# Patient Record
Sex: MALE | Race: OTHER | HISPANIC OR LATINO
[De-identification: names, ages, dates, MRNs, and addresses within clinical notes are randomized per-mention and may not be internally consistent; named-entity substitution may affect disease eponyms.]

---

## 2021-03-31 PROBLEM — Z00.00 ENCOUNTER FOR PREVENTIVE HEALTH EXAMINATION: Status: ACTIVE | Noted: 2021-03-31

## 2021-04-02 ENCOUNTER — APPOINTMENT (OUTPATIENT)
Dept: UROLOGY | Facility: CLINIC | Age: 71
End: 2021-04-02
Payer: MEDICARE

## 2021-04-02 VITALS
HEIGHT: 68 IN | BODY MASS INDEX: 25.76 KG/M2 | WEIGHT: 170 LBS | SYSTOLIC BLOOD PRESSURE: 156 MMHG | DIASTOLIC BLOOD PRESSURE: 77 MMHG | TEMPERATURE: 98.1 F

## 2021-04-02 DIAGNOSIS — Z78.9 OTHER SPECIFIED HEALTH STATUS: ICD-10-CM

## 2021-04-02 DIAGNOSIS — Z87.19 PERSONAL HISTORY OF OTHER DISEASES OF THE DIGESTIVE SYSTEM: ICD-10-CM

## 2021-04-02 DIAGNOSIS — Z80.1 FAMILY HISTORY OF MALIGNANT NEOPLASM OF TRACHEA, BRONCHUS AND LUNG: ICD-10-CM

## 2021-04-02 PROCEDURE — 99072 ADDL SUPL MATRL&STAF TM PHE: CPT

## 2021-04-02 PROCEDURE — 99204 OFFICE O/P NEW MOD 45 MIN: CPT

## 2021-04-02 NOTE — HISTORY OF PRESENT ILLNESS
[Nocturia] : nocturia [Erectile Dysfunction] : Erectile Dysfunction [FreeTextEntry1] : 70 year retired \par  x 20 years\par accompanied by wife\par found to have elevated PSA 8\par nocturia z 1\par no frequency\par excellent stream \par no hematuria [Urinary Incontinence] : no urinary incontinence [Urinary Retention] : no urinary retention [Urinary Urgency] : no urinary urgency [Urinary Frequency] : no urinary frequency [Straining] : no straining [Weak Stream] : no weak stream

## 2021-04-02 NOTE — ASSESSMENT
[FreeTextEntry1] : Ms. Hinojosa is a 70-year-old retired .  He presents with a elevated PSA.  He denies any urinary symptoms.  He does endorse erectile dysfunction which followed his cardiac catheterization and stent placement.  He and his wife are not interested in pursuing this at this time.\par \par He is referred for an elevated PSA of 8.0.  Not aware of having had a prior PSA.  His prostate exam is unremarkable.  He has no family history of prostate cancer.\par \par Clearly PSA of this elevation is of concern.  We will repeat the PSA at this time and proceed with a prostate MRI.  I discussed proceeding with a biopsy of the prostate.  His wife is at this point unwilling to proceed with this.  We discussed meeting again after the MRI so these findings can be reviewed in conjunction with PSA values.\par We discussed significance of these findings.\par \par \par The FELICITA HINOJOSA  expressed fully understanding of the information provided, the consequences and the management.\par

## 2021-04-02 NOTE — PHYSICAL EXAM
[Exaggerated Use Of Accessory Muscles For Inspiration] : no accessory muscle use [Auscultation Breath Sounds / Voice Sounds] : lungs were clear to auscultation bilaterally [Abdomen Soft] : soft [Abdomen Tenderness] : non-tender [Abdomen Mass (___ Cm)] : no abdominal mass palpated [Abdomen Hernia] : no hernia was discovered [Costovertebral Angle Tenderness] : no ~M costovertebral angle tenderness [Penis Abnormality] : normal uncircumcised penis [Epididymis] : the epididymides were normal [Testes Tenderness] : no tenderness of the testes [Testes Mass (___cm)] : there were no testicular masses [Prostate Enlargement] : the prostate was not enlarged [Prostate Tenderness] : the prostate was not tender [No Prostate Nodules] : no prostate nodules

## 2021-04-03 LAB
APPEARANCE: ABNORMAL
BACTERIA: NEGATIVE
BILIRUBIN URINE: NEGATIVE
BLOOD URINE: NEGATIVE
COLOR: YELLOW
GLUCOSE QUALITATIVE U: NEGATIVE
HYALINE CASTS: 1 /LPF
KETONES URINE: NEGATIVE
LEUKOCYTE ESTERASE URINE: NEGATIVE
MICROSCOPIC-UA: NORMAL
NITRITE URINE: NEGATIVE
PH URINE: 5.5
PROTEIN URINE: NORMAL
PSA SERPL-MCNC: 9.15 NG/ML
RED BLOOD CELLS URINE: 1 /HPF
SPECIFIC GRAVITY URINE: 1.02
SQUAMOUS EPITHELIAL CELLS: 0 /HPF
UROBILINOGEN URINE: NORMAL
WHITE BLOOD CELLS URINE: 3 /HPF

## 2021-04-05 ENCOUNTER — NON-APPOINTMENT (OUTPATIENT)
Age: 71
End: 2021-04-05

## 2021-04-05 LAB — BACTERIA UR CULT: NORMAL

## 2021-04-09 ENCOUNTER — NON-APPOINTMENT (OUTPATIENT)
Age: 71
End: 2021-04-09

## 2021-04-12 ENCOUNTER — NON-APPOINTMENT (OUTPATIENT)
Age: 71
End: 2021-04-12

## 2021-04-12 ENCOUNTER — APPOINTMENT (OUTPATIENT)
Dept: MRI IMAGING | Facility: HOSPITAL | Age: 71
End: 2021-04-12

## 2021-09-07 ENCOUNTER — APPOINTMENT (OUTPATIENT)
Dept: UROLOGY | Facility: CLINIC | Age: 71
End: 2021-09-07
Payer: MEDICARE

## 2021-09-07 VITALS
DIASTOLIC BLOOD PRESSURE: 94 MMHG | OXYGEN SATURATION: 97 % | HEART RATE: 77 BPM | BODY MASS INDEX: 25.76 KG/M2 | WEIGHT: 170 LBS | SYSTOLIC BLOOD PRESSURE: 163 MMHG | TEMPERATURE: 97.8 F | HEIGHT: 68 IN

## 2021-09-07 PROCEDURE — 99213 OFFICE O/P EST LOW 20 MIN: CPT

## 2021-09-07 NOTE — HISTORY OF PRESENT ILLNESS
[FreeTextEntry1] : 70 year retired \par  x 20 years\par accompanied by wife\par found to have elevated PSA 8\par nocturia z 1\par no frequency\par excellent stream \par no hematuria\par \par 9.7.2021\par returns re PSA\par occassional urgency\par no nocturis\par no dysfuria

## 2021-09-07 NOTE — ASSESSMENT
[FreeTextEntry1] : Ms. Hinojosa is a 70-year-old retired .  He presents with a elevated PSA.  He denies any urinary symptoms.  He does endorse erectile dysfunction which followed his cardiac catheterization and stent placement.  He and his wife are not interested in pursuing this at this time.\par \par He is referred for an elevated PSA of 8.0.  Not aware of having had a prior PSA.  His prostate exam is unremarkable.  He has no family history of prostate cancer.\par \par Clearly PSA of this elevation is of concern.  We will repeat the PSA at this time and proceed with a prostate MRI.  I discussed proceeding with a biopsy of the prostate.  His wife is at this point unwilling to proceed with this.  We discussed meeting again after the MRI so these findings can be reviewed in conjunction with PSA values.\par We discussed significance of these findings.\par \par \par The FELICITA HINOJOSA  expressed fully understanding of the information provided, the consequences and the management.\par \par 9.7.0221\par previously unable to get MRI \par not approved \par recent PSA 10.5\par \par We discussed the risks and complications of prostate biopsy and discussed the procedure.\par We discussed that procedure is performed by placing a rectal probe and administering anesthesia locally.\par Biopsies are then taken with a needle. Multiple biopsies are taken, \par Risks of the procedure include hematuria, hematospermia, blood per rectum.\par Risks include infection, sepsis and even death.\par We discussed perineal vs transrectal biopsy\par We discussed decreased risk of perineal biopsy vs transrectal but associated greater discomfort \par We discussed options of: 1. local, 2. IV sedation in Operating room 3. po Valium\par Fleets enema should be taken.\par ASA and NSAID must be stopped 1 week prior to procedure.\par

## 2021-09-09 LAB — BACTERIA UR CULT: NORMAL

## 2021-09-20 ENCOUNTER — NON-APPOINTMENT (OUTPATIENT)
Age: 71
End: 2021-09-20

## 2021-09-21 ENCOUNTER — NON-APPOINTMENT (OUTPATIENT)
Age: 71
End: 2021-09-21

## 2021-11-08 ENCOUNTER — RESULT REVIEW (OUTPATIENT)
Age: 71
End: 2021-11-08

## 2021-11-08 ENCOUNTER — TRANSCRIPTION ENCOUNTER (OUTPATIENT)
Age: 71
End: 2021-11-08

## 2021-11-09 ENCOUNTER — APPOINTMENT (OUTPATIENT)
Dept: UROLOGY | Facility: AMBULATORY SURGERY CENTER | Age: 71
End: 2021-11-09

## 2021-11-09 ENCOUNTER — OUTPATIENT (OUTPATIENT)
Dept: OUTPATIENT SERVICES | Facility: HOSPITAL | Age: 71
LOS: 1 days | Discharge: HOME | End: 2021-11-09
Payer: MEDICARE

## 2021-11-09 ENCOUNTER — TRANSCRIPTION ENCOUNTER (OUTPATIENT)
Age: 71
End: 2021-11-09

## 2021-11-09 LAB
GLUCOSE BLDC GLUCOMTR-MCNC: 95 MG/DL — SIGNIFICANT CHANGE UP (ref 70–99)
SARS-COV-2 N GENE NPH QL NAA+PROBE: NOT DETECTED

## 2021-11-09 PROCEDURE — 55706 BX PRST8 NDL SAT SAMPLING: CPT

## 2021-11-09 PROCEDURE — 88305 TISSUE EXAM BY PATHOLOGIST: CPT | Mod: 26

## 2021-11-09 PROCEDURE — 76872 US TRANSRECTAL: CPT | Mod: 26

## 2021-11-09 NOTE — BRIEF OPERATIVE NOTE - NSICDXBRIEFPREOP_GEN_ALL_CORE_FT
PRE-OP DIAGNOSIS:  Elevated PSA 09-Nov-2021 19:01:12  Lenny Frost  BPH with elevated PSA 09-Nov-2021 19:01:18  Lenny Frost

## 2021-11-09 NOTE — BRIEF OPERATIVE NOTE - NSICDXBRIEFPROCEDURE_GEN_ALL_CORE_FT
PROCEDURES:  Biopsy of prostate by transperineal approach with integrated magnetic resonance-ultrasound guidance 09-Nov-2021 19:01:01  Lenny Frost

## 2021-11-10 ENCOUNTER — NON-APPOINTMENT (OUTPATIENT)
Age: 71
End: 2021-11-10

## 2021-11-10 ENCOUNTER — APPOINTMENT (OUTPATIENT)
Dept: UROLOGY | Facility: CLINIC | Age: 71
End: 2021-11-10
Payer: MEDICARE

## 2021-11-10 VITALS
OXYGEN SATURATION: 98 % | SYSTOLIC BLOOD PRESSURE: 171 MMHG | DIASTOLIC BLOOD PRESSURE: 93 MMHG | HEART RATE: 81 BPM | TEMPERATURE: 98.3 F

## 2021-11-10 DIAGNOSIS — R97.20 ELEVATED PROSTATE, SPECIFIC ANTIGEN [PSA]: ICD-10-CM

## 2021-11-10 PROCEDURE — 51702 INSERT TEMP BLADDER CATH: CPT

## 2021-11-10 PROCEDURE — 99213 OFFICE O/P EST LOW 20 MIN: CPT | Mod: 25

## 2021-11-10 NOTE — ASSESSMENT
[FreeTextEntry1] : Ms. Hinojosa is a 70-year-old retired .  He presents with a elevated PSA.  He denies any urinary symptoms.  He does endorse erectile dysfunction which followed his cardiac catheterization and stent placement.  He and his wife are not interested in pursuing this at this time.\par \par He is referred for an elevated PSA of 8.0.  Not aware of having had a prior PSA.  His prostate exam is unremarkable.  He has no family history of prostate cancer.\par \par Clearly PSA of this elevation is of concern.  We will repeat the PSA at this time and proceed with a prostate MRI.  I discussed proceeding with a biopsy of the prostate.  His wife is at this point unwilling to proceed with this.  We discussed meeting again after the MRI so these findings can be reviewed in conjunction with PSA values.\par We discussed significance of these findings.\par \par \par The FELICITA HINOJOSA  expressed fully understanding of the information provided, the consequences and the management.\par \par \par 11.10.2021\par Patient returns this afternoon with inability to urinate.  He states he has been urinating well until this morning.  He has no fevers chills etc.  His bladder was distended on examination.  A 18 coudé was inserted with clear return of urine.\par \par He will be started on Flomax 0.4 mg daily.  He was warned re: dizziness.  He has hypertension.\par He will be started on Cipro 500 mg.\par \par We plan to proceed with a voiding trial on Monday morning.  He will start antibiotics prior to this 24 hours.\par Plan was discussed with the patient and his daughter.\par The FELICITA HINOJOSA  expressed fully understanding of the information provided, the consequences and the management.\par \par

## 2021-11-10 NOTE — HISTORY OF PRESENT ILLNESS
[FreeTextEntry1] : 70 year retired \par  x 20 years\par accompanied by wife\par found to have elevated PSA 8\par nocturia z 1\par no frequency\par excellent stream \par no hematuria\par \par 9.7.2021\par returns re PSA\par occassional urgency\par no nocturis\par no dysfuria\par \par \par 11.9.2021 - fusion biopsy prostate\par \par 11.10.2021 - urinary retention

## 2021-11-11 ENCOUNTER — RX RENEWAL (OUTPATIENT)
Age: 71
End: 2021-11-11

## 2021-11-12 LAB — SURGICAL PATHOLOGY STUDY: SIGNIFICANT CHANGE UP

## 2021-11-14 ENCOUNTER — LABORATORY RESULT (OUTPATIENT)
Age: 71
End: 2021-11-14

## 2021-11-14 ENCOUNTER — TRANSCRIPTION ENCOUNTER (OUTPATIENT)
Age: 71
End: 2021-11-14

## 2021-11-15 ENCOUNTER — APPOINTMENT (OUTPATIENT)
Dept: UROLOGY | Facility: CLINIC | Age: 71
End: 2021-11-15
Payer: MEDICARE

## 2021-11-15 VITALS — TEMPERATURE: 97.2 F | DIASTOLIC BLOOD PRESSURE: 79 MMHG | SYSTOLIC BLOOD PRESSURE: 161 MMHG | HEART RATE: 87 BPM

## 2021-11-15 PROCEDURE — A4218: CPT | Mod: NC

## 2021-11-15 PROCEDURE — 51700 IRRIGATION OF BLADDER: CPT | Mod: 79

## 2021-11-15 PROCEDURE — 99214 OFFICE O/P EST MOD 30 MIN: CPT | Mod: 24

## 2021-11-18 ENCOUNTER — APPOINTMENT (OUTPATIENT)
Dept: UROLOGY | Facility: CLINIC | Age: 71
End: 2021-11-18
Payer: MEDICARE

## 2021-11-18 VITALS — SYSTOLIC BLOOD PRESSURE: 153 MMHG | HEART RATE: 78 BPM | DIASTOLIC BLOOD PRESSURE: 88 MMHG | TEMPERATURE: 98 F

## 2021-11-18 PROCEDURE — 99213 OFFICE O/P EST LOW 20 MIN: CPT | Mod: 25

## 2021-11-18 PROCEDURE — 51798 US URINE CAPACITY MEASURE: CPT

## 2021-11-18 PROCEDURE — 51702 INSERT TEMP BLADDER CATH: CPT

## 2021-11-18 NOTE — HISTORY OF PRESENT ILLNESS
[FreeTextEntry1] : 70 year retired \par  x 20 years\par accompanied by wife\par found to have elevated PSA 8\par nocturia x 1\par no frequency\par excellent stream \par no hematuria\par \par 9.7.2021\par returns re PSA\par occasional urgency\par no nocturia\par no dysuria\par \par \par 11.9.2021 - fusion biopsy prostate\par \par 11.10.2021 - urinary retention\par \par 11.15.2021\par returns with daughters to review biopsy and management of urinary retention

## 2021-11-18 NOTE — HISTORY OF PRESENT ILLNESS
[FreeTextEntry1] : 70 year retired \par  x 20 years\par accompanied by wife\par found to have elevated PSA 8\par nocturia x 1\par no frequency\par excellent stream \par no hematuria\par \par 9.7.2021\par returns re PSA\par occasional urgency\par no nocturia\par no dysuria\par \par \par 11.9.2021 - fusion biopsy prostate\par \par 11.10.2021 - urinary retention\par \par 11.15.2021\par returns with daughters to review biopsy and management of urinary retention\par \par 11.18.2021\par returns with daughter for TOV\par he has resumed flomax and has taken cipro for TOV

## 2021-11-18 NOTE — ASSESSMENT
[FreeTextEntry1] : Ms. Hinojosa is a 70-year-old retired .  He presents with a elevated PSA.  He denies any urinary symptoms.  He does endorse erectile dysfunction which followed his cardiac catheterization and stent placement.  He and his wife are not interested in pursuing this at this time.\par \par He is referred for an elevated PSA of 8.0.  Not aware of having had a prior PSA.  His prostate exam is unremarkable.  He has no family history of prostate cancer.\par \par Clearly PSA of this elevation is of concern.  We will repeat the PSA at this time and proceed with a prostate MRI.  I discussed proceeding with a biopsy of the prostate.  His wife is at this point unwilling to proceed with this.  We discussed meeting again after the MRI so these findings can be reviewed in conjunction with PSA values.\par We discussed significance of these findings.\par \par \par The FELICITA HINOJOSA  expressed fully understanding of the information provided, the consequences and the management.\par \par \par 11.10.2021\par Patient returns this afternoon with inability to urinate.  He states he has been urinating well until this morning.  He has no fevers chills etc.  His bladder was distended on examination.  A 18 coudé was inserted with clear return of urine.\par \par He will be started on Flomax 0.4 mg daily.  He was warned re: dizziness.  He has hypertension.\par He will be started on Cipro 500 mg.\par \par We plan to proceed with a voiding trial on Monday morning.  He will start antibiotics prior to this 24 hours.\par Plan was discussed with the patient and his daughter.\par The FELICITA HINOJOSA  expressed fully understanding of the information provided, the consequences and the management.\par \par \par 11.15.2021\par Patient returns with 2 daughters.\par Reviewed pathology\par \par PROSTATE CANCER STAGING PSA:\par Crowder's score, number of cores, extent of cores\par \par We discussed risk classification based on Crowder's score, PSA and % of cores positive\par Favorable intermediate	[id number] OR  Crowder score 3+4 = 7/grade group 2 OR  PSA 10 to 20 ng/mL  AND Percentage of positive biopsy cores <50% 	\par Unfavorable intermediate	[id number] OR  Crowder score 3+4 = 7/grade group 2 or Maxim score 4+3 = 7/grade group 3 OR  PSA 10 to 20 ng/mL 	\par \par Adapted from: NCCN Clinical Practice Guidelines in Oncology (NCCN Guidelines®): Prostate Cancer. Version 4.2018. \par \par Sandy Accession Number : [id number]\par Patient:   FELICITA HINOJOSA\par \par \par Accession:                             [id number]\par \par Collected Date/Time:                   11/9/2021 00:32 EST\par Received Date/Time:                    11/10/2021 00:32 EST\par \par Surgical Pathology Report - Auth (Verified)\par \par Specimen(s) Submitted\par 1  Left anterior apex\par 2  Left anterior base\par 3  Right anterior apex\par 4  Right anterior base\par 5  Midline apex\par 6  Midline base\par 7  Left posterior apex\par 8  Left posterior base\par 9  Right posterior apex\par 10  Right posterior base\par 11  Left lateral\par 12  Right lateral\par 13  MRI target #1 left peripheral\par \par \par Final Diagnosis\par 1. Prostate, left anterior apex, biopsy\par -Benign prostate tissue.\par \par 2. Prostate, left anterior base, biopsy\par -Benign prostate tissue.\par \par 3. Prostate, right anterior apex, biopsy\par -Benign prostate tissue.\par \par 4. Prostate, right anterior base, biopsy\par -Benign prostate tissue.\par \par 5. Prostate, midline apex, biopsy\par -Benign prostate tissue.\par \par 6. Prostate, midline base, biopsy\par -Benign prostate tissue.\par \par 7. Prostate, left posterior apex, biopsy\par -Adenocarcinoma of the prostate, Prognostic Grade Group 2 (Crowder\par score 3+4=7) involving 40% and 15% (2 mm and 1 mm in length) of 2 of 2\par core(s). Maxim pattern 4 comprises 5% of tumor.\par \par 8. Prostate, left posterior base, biopsy\par -Benign prostate tissue.\par \par 9. Prostate, right posterior apex, biopsy\par -Benign prostate tissue.\par \par 10. Prostate, right posterior base, biopsy\par -Adenocarcinoma of the prostate, Prognostic Grade Group 1 (Crowder\par score 3+3=6) involving less than 5% (less than 0.5 mm in length) of 1 of\par 1 core(s).\par \par 11. Prostate, left lateral, biopsy\par -Benign prostate tissue.\par \par 12. Prostate, right lateral, biopsy\par -Benign prostate tissue.\par \par 13. Prostate, MRI target 1 left peripheral, biopsy\par -Adenocarcinoma of the prostate, Prognostic Grade Group 2 (Crowder\par score 3+4=7) involving 100%, 90%, 90%, 90% and 60% (11 mm, 9 mm, 9 mm, 9\par mm and 6 mm in length) of 5 of 5 core(s). Maxim pattern 4 comprises 15%\par of tumor.\par -Perineural invasion is identified.\par \par \par \par We discussed treatment options:\par \par Crowder's score, number of cores, extent of cores\par \par We discussed risk classification based on Maxim's score, PSA and % of cores positive\par Favorable intermediate	[id number] OR  Crowder score 3+4 = 7/grade group 2 OR  PSA 10 to 20 ng/mL  AND Percentage of positive biopsy cores <50% 	\par Unfavorable intermediate	[id number] OR  Crowder score 3+4 = 7/grade group 2 or Maxim score 4+3 = 7/grade group 3 OR  PSA 10 to 20 ng/mL 	\par \par 1. RT and radical prostatectomy are both appropriate \par options for men with intermediate-risk disease.\par \par 2.  Active surveillance is an option for for those with favorable intermediate-risk disease. Patient informed that this comes with a higher risk of developing metastases compared \par with definitive treatment. \par \par The choice of a specific approach requires a \par consideration of the benefits and risks associated with each approach, taking \par into account the patient's age, individual preferences and comorbidities, and \par estimated life expectancy.\par \par We had an extensive conversation regarding \par Risks and complications of each were discussed with special reference to sexual and urinary function\par We discussed recommendation for radation therapy in light of pathology and PSA and comorbidities.\par We discussed ADT and side effects and data re ERT + ADT\par \par Recommend \par We discussed referral to Dr Kuhn to discuss RALP\par We discussed referral to Dr Lauro Hallman  to discuss ERT\par We discussed proceeding with Decipher score and potential significance\par The FELICITA HINOJOSA  and his daughters expressed fully understanding of the information provided, the consequences and the management. \par \par Plan:\par 1. decipher test\par 2. referral Dr Hallman\par 3. referral to Dr UMBERTO Kuhn\par \par \par 11.18.2021\par \par \par Patient returns having resumed Flomax for trial of void.  He was observed during the day.  He is progressing increasing postvoid residuals with voiding of small amounts.  After discussion with the patient and his daughter he chose to be recatheterized rather than risk of going into urinary retention.  He will increase his Flomax to 2 times daily and follow-up with Dr. Strickland next week regarding management of his prostate cancer in light of his urinary retention

## 2021-11-18 NOTE — ASSESSMENT
[FreeTextEntry1] : Ms. Hinojosa is a 70-year-old retired .  He presents with a elevated PSA.  He denies any urinary symptoms.  He does endorse erectile dysfunction which followed his cardiac catheterization and stent placement.  He and his wife are not interested in pursuing this at this time.\par \par He is referred for an elevated PSA of 8.0.  Not aware of having had a prior PSA.  His prostate exam is unremarkable.  He has no family history of prostate cancer.\par \par Clearly PSA of this elevation is of concern.  We will repeat the PSA at this time and proceed with a prostate MRI.  I discussed proceeding with a biopsy of the prostate.  His wife is at this point unwilling to proceed with this.  We discussed meeting again after the MRI so these findings can be reviewed in conjunction with PSA values.\par We discussed significance of these findings.\par \par \par The FELICITA HINOJOSA  expressed fully understanding of the information provided, the consequences and the management.\par \par \par 11.10.2021\par Patient returns this afternoon with inability to urinate.  He states he has been urinating well until this morning.  He has no fevers chills etc.  His bladder was distended on examination.  A 18 coudé was inserted with clear return of urine.\par \par He will be started on Flomax 0.4 mg daily.  He was warned re: dizziness.  He has hypertension.\par He will be started on Cipro 500 mg.\par \par We plan to proceed with a voiding trial on Monday morning.  He will start antibiotics prior to this 24 hours.\par Plan was discussed with the patient and his daughter.\par The FELICITA HINOJOSA  expressed fully understanding of the information provided, the consequences and the management.\par \par \par 11.15.2021\par Patient returns with 2 daughters.\par Reviewed pathology\par \par PROSTATE CANCER STAGING PSA:\par [place]'s score, number of cores, extent of cores\par \par We discussed risk classification based on Maxim's score, PSA and % of cores positive\par Favorable intermediate	[id number]-C6r OR  [place] score 3+4 = 7/grade group 2 OR  PSA 10 to 20 ng/mL  AND Percentage of positive biopsy cores <50% 	\par Unfavorable intermediate	[id number]-[id number] OR  [place] score 3+4 = 7/grade group 2 or Maxim score 4+3 = 7/grade group 3 OR  PSA 10 to 20 ng/mL 	\par \par Adapted from: NCCN Clinical Practice Guidelines in Oncology (NCCN Guidelines®): Prostate Cancer. Version 4.2018. \par \par Sandy Accession Number : [id number]\par Patient:   FELICITA HINOJOSA\par \par \par Accession:                             [id number]\par \par Collected Date/Time:                   11/9/2021 00:32 EST\par Received Date/Time:                    11/10/2021 00:32 EST\par \par Surgical Pathology Report - Auth (Verified)\par \par Specimen(s) Submitted\par 1  Left anterior apex\par 2  Left anterior base\par 3  Right anterior apex\par 4  Right anterior base\par 5  Midline apex\par 6  Midline base\par 7  Left posterior apex\par 8  Left posterior base\par 9  Right posterior apex\par 10  Right posterior base\par 11  Left lateral\par 12  Right lateral\par 13  MRI target #1 left peripheral\par \par \par Final Diagnosis\par 1. Prostate, left anterior apex, biopsy\par -Benign prostate tissue.\par \par 2. Prostate, left anterior base, biopsy\par -Benign prostate tissue.\par \par 3. Prostate, right anterior apex, biopsy\par -Benign prostate tissue.\par \par 4. Prostate, right anterior base, biopsy\par -Benign prostate tissue.\par \par 5. Prostate, midline apex, biopsy\par -Benign prostate tissue.\par \par 6. Prostate, midline base, biopsy\par -Benign prostate tissue.\par \par 7. Prostate, left posterior apex, biopsy\par -Adenocarcinoma of the prostate, Prognostic Grade Group 2 ([place]\par score 3+4=7) involving 40% and 15% (2 mm and 1 mm in length) of 2 of 2\par core(s). Maxim pattern 4 comprises 5% of tumor.\par \par 8. Prostate, left posterior base, biopsy\par -Benign prostate tissue.\par \par 9. Prostate, right posterior apex, biopsy\par -Benign prostate tissue.\par \par 10. Prostate, right posterior base, biopsy\par -Adenocarcinoma of the prostate, Prognostic Grade Group 1 ([place]\par score 3+3=6) involving less than 5% (less than 0.5 mm in length) of 1 of\par 1 core(s).\par \par 11. Prostate, left lateral, biopsy\par -Benign prostate tissue.\par \par 12. Prostate, right lateral, biopsy\par -Benign prostate tissue.\par \par 13. Prostate, MRI target 1 left peripheral, biopsy\par -Adenocarcinoma of the prostate, Prognostic Grade Group 2 ([place]\par score 3+4=7) involving 100%, 90%, 90%, 90% and 60% (11 mm, 9 mm, 9 mm, 9\par mm and 6 mm in length) of 5 of 5 core(s). Maxim pattern 4 comprises 15%\par of tumor.\par -Perineural invasion is identified.\par \par \par \par We discussed treatment options:\par \par Maxim's score, number of cores, extent of cores\par \par We discussed risk classification based on [place]'s score, PSA and % of cores positive\par Favorable intermediate	[id number] OR  [place] score 3+4 = 7/grade group 2 OR  PSA 10 to 20 ng/mL  AND Percentage of positive biopsy cores <50% 	\par Unfavorable intermediate	[id number] OR  [place] score 3+4 = 7/grade group 2 or [place] score 4+3 = 7/grade group 3 OR  PSA 10 to 20 ng/mL 	\par \par 1. RT and radical prostatectomy are both appropriate \par options for men with intermediate-risk disease.\par \par 2.  Active surveillance is an option for for those with favorable intermediate-risk disease. Patient informed that this comes with a higher risk of developing metastases compared \par with definitive treatment. \par \par The choice of a specific approach requires a \par consideration of the benefits and risks associated with each approach, taking \par into account the patient's age, individual preferences and comorbidities, and \par estimated life expectancy.\par \par We had an extensive conversation regarding \par Risks and complications of each were discussed with special reference to sexual and urinary function\par We discussed recommendation for radation therapy in light of pathology and PSA and comorbidities.\par We discussed ADT and side effects and data re ERT + ADT\par \par Recommend \par We discussed referral to Dr Kuhn to discuss RALP\par We discussed referral to Dr Lauro Hallman  to discuss ERT\par We discussed proceeding with Decipher score and potential significance\par The FELICITA HINOJOSA  and his daughters expressed fully understanding of the information provided, the consequences and the management. \par \par Plan:\par 1. decipher test\par 2. referral Dr Hallman\par 3. referral to Dr UMBERTO Kuhn

## 2021-11-24 ENCOUNTER — APPOINTMENT (OUTPATIENT)
Dept: UROLOGY | Facility: CLINIC | Age: 71
End: 2021-11-24
Payer: MEDICARE

## 2021-11-24 VITALS — TEMPERATURE: 97.9 F | HEART RATE: 93 BPM | SYSTOLIC BLOOD PRESSURE: 164 MMHG | DIASTOLIC BLOOD PRESSURE: 83 MMHG

## 2021-11-24 DIAGNOSIS — N52.9 MALE ERECTILE DYSFUNCTION, UNSPECIFIED: ICD-10-CM

## 2021-11-24 DIAGNOSIS — R33.9 RETENTION OF URINE, UNSPECIFIED: ICD-10-CM

## 2021-11-24 DIAGNOSIS — I25.10 ATHEROSCLEROTIC HEART DISEASE OF NATIVE CORONARY ARTERY W/OUT ANGINA PECTORIS: ICD-10-CM

## 2021-11-24 DIAGNOSIS — E11.9 TYPE 2 DIABETES MELLITUS W/OUT COMPLICATIONS: ICD-10-CM

## 2021-11-24 PROCEDURE — 51798 US URINE CAPACITY MEASURE: CPT

## 2021-11-24 PROCEDURE — 99215 OFFICE O/P EST HI 40 MIN: CPT | Mod: 25

## 2021-11-24 NOTE — PHYSICAL EXAM
[General Appearance - Well Developed] : well developed [General Appearance - Well Nourished] : well nourished [Normal Appearance] : normal appearance [Well Groomed] : well groomed [General Appearance - In No Acute Distress] : no acute distress [Edema] : no peripheral edema [Respiration, Rhythm And Depth] : normal respiratory rhythm and effort [Exaggerated Use Of Accessory Muscles For Inspiration] : no accessory muscle use [Abdomen Soft] : soft [Abdomen Tenderness] : non-tender [Costovertebral Angle Tenderness] : no ~M costovertebral angle tenderness [Urethral Meatus] : meatus normal [Penis Abnormality] : normal uncircumcised penis [Urinary Bladder Findings] : the bladder was normal on palpation [Normal Station and Gait] : the gait and station were normal for the patient's age [] : no rash [No Focal Deficits] : no focal deficits [Oriented To Time, Place, And Person] : oriented to person, place, and time [Affect] : the affect was normal [Mood] : the mood was normal [Not Anxious] : not anxious

## 2021-11-26 PROBLEM — R33.9 URINARY RETENTION: Status: ACTIVE | Noted: 2021-11-10

## 2021-11-26 PROBLEM — N52.9 IMPOTENCE OF ORGANIC ORIGIN: Status: ACTIVE | Noted: 2021-04-02

## 2021-11-26 PROBLEM — E11.9 CONTROLLED TYPE 2 DIABETES MELLITUS: Status: RESOLVED | Noted: 2021-04-02 | Resolved: 2021-11-26

## 2021-11-26 NOTE — HISTORY OF PRESENT ILLNESS
[FreeTextEntry1] : FELICITA HINOJOSA  is a 71 year old man with PMHx HTN, NIDDM, CAD s/p PCI 2015 (1 stent) who presents for newly diagnosed prostate cancer. He underwent a fusion biopsy on 11/9/21 which was positive for [place] GG2 (targeted) and GG2 (LPA) and GG1 (RPB) on the standard biopsy. This was his first biopsy. The MRI that prompted the biopsy demonstrated a PIRADS 5 lesion, left mid pzpl. PSA at diagnosis was 10.5 ng/mL. He denies family hx of  malignancies. He presents to discuss surgery. No previous abdominal surgeries. He saw his cardiologist yesterday who provided a letter stating he was low risk for surgery.\par \par He has been experiencing urinary retention since his biopsy requiring a Green catheter. Had Green placed on 11/10/21 and started on Flomax. Failed TOV on 11/18/21 and doubled Flomax. He had minimal urinary issues prior to biopsy. Symptoms included urgency and post void dribbling. He would wear depends if going on a long trip. He denies erectile dysfunction.\par \par 230cc sterile water instilled\par Voided 200cc clear yellow urine\par PVR 24cc\par Strong flow\par \par /83. Deneis FRAUSTO, dizziness, vision changes. Has taken his BP medication this morning, he is unsure of the name.\par \par PSA Hx\par 10.5 8/26/21\par 9.1 4/2/21\par \par MRI Hx\par MRI at [facility] on 9/11/2021.  82cc prostate with PIRADS 5 lesion measuring 2.2cm, left mid pzpl. No LAD No EPE, No Bony Lesions.  \par \par Biopsy Hx\par 11/9/21-with Dr Hewitt\par LPA: Maxim 3+4, 2mm, 40%, 5% pattern 4\par RPB: [place] 3+3, 0.5mm, <5%\par Target (left peripheral): [place] 3+4, 11mm, 100%, 15% pattern 4, + PNI\par \par The patient denies fevers, chills, nausea and or vomiting and no unexplained weight loss.\par \par All pertinent laboratory, films and physician notes were reviewed.  Questionnaire results were discussed with patient.

## 2021-11-26 NOTE — ASSESSMENT
[FreeTextEntry1] : 71 year old man with newly diagnosed cT1c, [place] GG2, PSA 9.15 prostate cancer experiencing urinary retention s/p biopsy on 11/9/21. Minimal urinary complaints prior to biopsy, no ED, no previous abdominal surgeries. \par \par Discussed biopsy findings with patient and treatment options\par Reviewed MRI imaging and pathology report\par Patient is considered intermediate risk by NCCN guidelines. \par Sharon 4 pattern is low volume\par Standard treatment options including watchful waiting, active surveillance, radical prostatectomy or radiation therapy were discussed. \par Radiation can be given as combined brachytherapy and external beam with or without adjuvant hormone treatment for 6 months.\par Discussed alternative treatment options including focal therapy.\par Radical prostatectomy is often performed by robot assisted laparoscopic technique. Discussed immediate risks of surgery including bleeding, infection, blood clot or injury to surrounding organs. Discussed long term risks including urinary incontinence and sexual dysfunction. \par Discussed risks of radiation treatment including urinary and bowel symptoms and sexual dysfunction. \par Discussed risks of adjuvant hormone treatment including hot flashes, fatigue, sexual dysfunction. \par Passed TOV today. Return precautions reviewed\par He will meet with Rad Onc and return to review treatment decision

## 2021-12-16 ENCOUNTER — APPOINTMENT (OUTPATIENT)
Dept: RADIATION ONCOLOGY | Facility: CLINIC | Age: 71
End: 2021-12-16
Payer: MEDICARE

## 2021-12-16 VITALS
SYSTOLIC BLOOD PRESSURE: 145 MMHG | BODY MASS INDEX: 26.98 KG/M2 | HEART RATE: 105 BPM | DIASTOLIC BLOOD PRESSURE: 83 MMHG | OXYGEN SATURATION: 96 % | HEIGHT: 68 IN | WEIGHT: 178 LBS | TEMPERATURE: 98 F

## 2021-12-16 LAB — PSA SERPL-MCNC: 10.4 NG/ML

## 2021-12-16 PROCEDURE — 99205 OFFICE O/P NEW HI 60 MIN: CPT | Mod: 25

## 2021-12-16 NOTE — HISTORY OF PRESENT ILLNESS
[FreeTextEntry1] : Mr. Raoul Vidal is a 71 year old male diagnosed with unfavorable intermediate prostate adenocarcinoma, [place] 7(3+4), PSA 10.4 ng/mL, pT1c, Stage IIB.  He had MRI imaging with evidence of extracapsular extension, consistent with high risk features.  He presents, today, for consideration of definitive radiation therapy.\par \par Oncologic history:\par 4/2/21- f/u with Dr. Hewitt for elevated PSA 9.1 ng/mL\par \par 8/26/21- PSA  10.5 ng/mL\par \par 9/11/21- MRI\par PIRADS 5,  82cc prostate\par lesion measuring 2.2cm, lateral/posterolateral left peripheral zone from mid gland to apex- with evidence of OSMANY, \par No evidence of SVI,  LAD or Bony Lesions. \par \par 11/9/21- Fusion Biopsy of prostate - highest [place] 7(3+4) and total of 3/13 cores positive \par Final Diagnosis\par \par 7. Prostate, left posterior apex, biopsy\par -Adenocarcinoma of the prostate, Prognostic Grade Group 2 (Maxim\par score 3+4=7) involving 40% and 15% (2 mm and 1 mm in length) of 2 of 2\par core(s). [place] pattern 4 comprises 5% of tumor.\par \par \par 10. Prostate, right posterior base, biopsy\par -Adenocarcinoma of the prostate, Prognostic Grade Group 1 ([place]\par score 3+3=6) involving less than 5% (less than 0.5 mm in length) of 1 of\par 1 core(s).\par \par \par 13. Prostate, MRI target 1 left peripheral, biopsy\par -Adenocarcinoma of the prostate, Prognostic Grade Group 2 ([place]\par score 3+4=7) involving 100%, 90%, 90%, 90% and 60% (11 mm, 9 mm, 9 mm, 9\par mm and 6 mm in length) of 5 of 5 core(s). [place] pattern 4 comprises 15%\par of tumor.\par -Perineural invasion is identified.\par \par 11/10/21- Followed up with Dr. Hewitt, pt had been experiencing urinary retention since his biopsy and required a Green catheter 18Fr Coude tip and started on Flomax. \par \par 11/18/21- Failed TOV and doubled Flomax. \par \par 11/24/21- Green D/C'd and passed TOV with Dr. Strickland\par \par 11/30/21- Decipher Score = 0.59 - intermediate\par \par Today he presents for consideration of radiation therapy to the prostate with his daughter, referred by Dr. Hewitt.  Overall, the patient states he feels well and denies any radiation therapy in the past.  He notes baseline urine function with and nocturia x1, while using flomax 0.4mg BID. He has good urine flow and denies urinary frequency or urgency. He denies dysuria, hematuria, leakage or incontinence. He has well-formed bowel movements x1 every 4 days, denies blood or mucous in the stool. He denies rectal pain and states a history of no hemorrhoids. He is unsure when his last colonoscopy was but thinks it was in 2019, he will double check with his physician. He is unable to have erections and is not interested in any ED medication. Denies any interest in sperm banking. \par

## 2021-12-16 NOTE — VITALS
[Maximal Pain Intensity: 0/10] : 0/10 [Least Pain Intensity: 0/10] : 0/10 [90: Able to carry normal activity; minor signs or symptoms of disease.] : 90: Able to carry normal activity; minor signs or symptoms of disease.  [Date: ____________] : Patient's last distress assessment performed on [unfilled]. [8 - Distress Level] : Distress Level: 8 [Referred Patient  to social work for follow-up] : Patient was referred to social work for follow-up

## 2021-12-16 NOTE — DISEASE MANAGEMENT
[c] : c [10-20] : 10 - 20 ng/mL [Biopsy] : Patient had a biopsy on [7(3+4)] : Template Biopsy [place] Score: 7(3+4) [] : Patient had a Prostate MRI [5] : 5 [Extracapsular Extension] : Extracapsular extension [IIB] : IIB [1] : T1 [BiopsyDate] : 11/9/21 [MeasuredProstateVolume] : 82 [TotalCores] : 13 [TotalPositiveCores] : 3

## 2021-12-16 NOTE — PHYSICAL EXAM
[Normal] : normal spine exam without palpable tenderness, no kyphosis or scoliosis [FreeTextEntry1] : Declined digital rectal examination

## 2021-12-22 ENCOUNTER — APPOINTMENT (OUTPATIENT)
Dept: UROLOGY | Facility: CLINIC | Age: 71
End: 2021-12-22

## 2022-02-23 ENCOUNTER — APPOINTMENT (OUTPATIENT)
Dept: UROLOGY | Facility: CLINIC | Age: 72
End: 2022-02-23

## 2022-03-03 ENCOUNTER — NON-APPOINTMENT (OUTPATIENT)
Age: 72
End: 2022-03-03

## 2022-04-14 ENCOUNTER — NON-APPOINTMENT (OUTPATIENT)
Age: 72
End: 2022-04-14

## 2022-04-20 ENCOUNTER — NON-APPOINTMENT (OUTPATIENT)
Age: 72
End: 2022-04-20

## 2022-10-03 ENCOUNTER — APPOINTMENT (OUTPATIENT)
Dept: RADIATION ONCOLOGY | Facility: CLINIC | Age: 72
End: 2022-10-03

## 2022-10-03 ENCOUNTER — NON-APPOINTMENT (OUTPATIENT)
Age: 72
End: 2022-10-03

## 2022-10-03 VITALS
DIASTOLIC BLOOD PRESSURE: 97 MMHG | TEMPERATURE: 98.3 F | HEIGHT: 68 IN | WEIGHT: 183.7 LBS | BODY MASS INDEX: 27.84 KG/M2 | OXYGEN SATURATION: 98 % | SYSTOLIC BLOOD PRESSURE: 168 MMHG | RESPIRATION RATE: 16 BRPM | HEART RATE: 74 BPM

## 2022-10-03 PROCEDURE — 99214 OFFICE O/P EST MOD 30 MIN: CPT | Mod: 25

## 2022-10-03 RX ORDER — CIPROFLOXACIN HYDROCHLORIDE 500 MG/1
500 TABLET, FILM COATED ORAL TWICE DAILY
Qty: 14 | Refills: 0 | Status: DISCONTINUED | COMMUNITY
Start: 2021-11-10 | End: 2022-10-03

## 2022-10-03 RX ORDER — TAMSULOSIN HYDROCHLORIDE 0.4 MG/1
0.4 CAPSULE ORAL
Qty: 90 | Refills: 0 | Status: DISCONTINUED | COMMUNITY
Start: 2021-11-10 | End: 2022-10-03

## 2022-10-03 NOTE — PHYSICAL EXAM
[] : no respiratory distress [Exaggerated Use Of Accessory Muscles For Inspiration] : no accessory muscle use [Oriented To Time, Place, And Person] : oriented to person, place, and time [Normal] : oriented to person, place and time, the affect was normal, the mood was normal and not anxious

## 2022-10-03 NOTE — REVIEW OF SYSTEMS
[Negative] : Neurological [Anal Pain: Grade 0] : Anal Pain: Grade 0 [Constipation: Grade 0] : Constipation: Grade 0 [Diarrhea: Grade 0] : Diarrhea: Grade 0 [Fecal Incontinence: Grade 0] : Fecal Incontinence: Grade 0 [Proctitis: Grade 0] : Proctitis: Grade 0 [Rectal Pain: Grade 0] : Rectal Pain: Grade 0 [Hematuria: Grade 0] : Hematuria: Grade 0 [Urinary Incontinence: Grade 0] : Urinary Incontinence: Grade 0  [Urinary Retention: Grade 0] : Urinary Retention: Grade 0 [Urinary Tract Pain: Grade 0] : Urinary Tract Pain: Grade 0 [Urinary Urgency: Grade 0] : Urinary Urgency: Grade 0 [Urinary Frequency: Grade 0] : Urinary Frequency: Grade 0 [Ejaculation Disorder: Grade 0] : Ejaculation Disorder: Grade 0 [Erectile Dysfunction: Grade 0] : Erectile Dysfunction: Grade 0 [IPSS Score (0-40): ___] : IPSS score: [unfilled] [EPIC-CP Score (0-60): ___] : EPIC-CP score: [unfilled]

## 2022-10-03 NOTE — HISTORY OF PRESENT ILLNESS
[FreeTextEntry1] : Mr. Raoul Vidal is a 72  year old male with a diagnosis of favorable intermediate risk Prostatic Adenocarcinoma,[place] Score 7(3+4), 8 /18 cores positive with 100% involvement. Initially seen by  on 12/16/2021 \par \par 11/222/2021: Decipher Score: 0.59 (Intermediate Risk), 5 year risk of metastasis is 2.7%, 10 year risk of metastasis is 6.9%, 15 year risk of prostate cancer is 9.5%\par \par PMH: CAD s/p PCI 2015 (1 stent), urinary retention and on aiken catheter since 11/10/2021, started on Flomax. Failed TOV on 11/18/2021 and doubled Flomax.Aiken discontinued and passed TOV on 11/24/2021 with .\par \par (Urologist: , )\par \par PSA Trend:\par 03/23/2021:   8.5   ng/mL\par 04/02/2021:   9.15 ng/mL\par 12/14/2021: 10.40 ng/mL\par \par Patient presents today for consideration of radiation therapy options to the prostate accompanied by his daughter Jenny, referred by . Overall, the patient states he feels well and denies any radiation therapy in the past.  He notes baseline urine function with and nocturia x1 at night, on Flomax BID. Complains of urgency to urinate. He denies urinary frequency, dribbling, urinary retention, dysuria, hematuria, leakage or incontinence. Bowel movement is every other day. Not sexually active, hard stools.  He has well-formed bowel movements. He denies blood or mucous in the stool.  Unable to recall when colonoscopy was done.PSA requisition given for blood work.\par \par \par 11/26/2021: PVR: 20ml\par \par 11/09/2021: Pathology () showed  Prostatic Adenocarcinoma, Maxim Score 7(3+4), 8 /18 cores positive with 100% involvement. \par \par 9/11/2021: MRI Prostate showed volume 82 cc and one lesion noted with intracapsular extension. No evidence of extraprostatic extension, no lymphadenopathy, no SVI and no evidence of osseous metastasis. PIRADS: 5

## 2022-10-03 NOTE — DISEASE MANAGEMENT
[1] : T1 [c] : c [Biopsy] : Patient had a biopsy on [7(3+4)] : Template Biopsy [place] Score: 7(3+4) [IIB] : IIB [5] : 5 [] : Patient had no Bone Scan performed [BiopsyDate] : 11/09/2021 [MeasuredProstateVolume] : 82 [TotalCores] : 18 [TotalPositiveCores] : 8 [MaxCoreInvolvement] : 100%

## 2022-10-04 ENCOUNTER — NON-APPOINTMENT (OUTPATIENT)
Age: 72
End: 2022-10-04

## 2022-10-13 ENCOUNTER — APPOINTMENT (OUTPATIENT)
Dept: UROLOGY | Facility: CLINIC | Age: 72
End: 2022-10-13

## 2022-12-09 LAB — PSA SERPL-MCNC: 9.79 NG/ML

## 2023-01-16 ENCOUNTER — FORM ENCOUNTER (OUTPATIENT)
Age: 73
End: 2023-01-16

## 2023-02-23 ENCOUNTER — NON-APPOINTMENT (OUTPATIENT)
Age: 73
End: 2023-02-23

## 2023-02-23 NOTE — PROCEDURE
[FreeTextEntry1] : TRANSPERINEAL PLACEMENT OF SPACEOAR GEL AND MARKERS PLACEMENT  [FreeTextEntry2] :  IN PREPARATION FOR RADIATION TREATMENT FOR PROSTATE CANCER  [FreeTextEntry3] : Mr. Raoul Vidal is a 72  year old patient with [place] score: 7(3+4) adenocarcinoma of the prostate. He presents today for transperineal placement of Spaceoar gel and fiducials in preparation for radiation therapy for his prostate cancer treatment.  \par \par In preparation for the procedure, he self-administered an enema one hour before leaving home and was NPO the night before procedure. He was prescribed a 3-day course of oral antibiotics twice daily to be started a day prior to the procedure. Topical ANECREAM cream was applied to the perineal area 10 mins prior to the procedure. The patient was prescribed, patient refused Valium 5 mg but took Tylenol 650 mg upon arrival in the department one hour to procedure. Procedure risk and benefits were reviewed with patient and a written consent was obtained prior to procedure. A time out was observed with patient name, date of birth, procedure, position, and site verified. \par \par The patient was placed in a lithotomy position. Chloral prep was used to prep the skin. While maintaining aseptic technique an ultrasound probe was inserted into the rectum to visualize the prostate. Less than 10 cc of Lidocaine 2% plus 8.4% sodium bicarbonate was injected subcutaneously. Afterwards, 20 cc of Lidocaine and sodium bicarbonate was injected internally at the prostate apex and bilateral neurovascular bundles for the nerve block. \par \par Three fiducial markers were prepared on the sterile field. One fiducial marker was placed into each of the following sites: left lobe, right lobe and apex via 14 -gauge needles under ultrasound guidance. \par \par Next, the hydrogel spacer kit was opened onto the sterile field and the hydrogel injection apparatus was prepared. An 18-gauge needle was positioned into the mid-line perirectal fat between the anterior rectal wall and prostate under ultrasound guidance. Less than 10 cc of saline was injected via the needle to hydro dissect the space and confirm proper placement in both axial and sagittal views. The syringe was aspirated to confirm the needle was extravascular. The syringe was replaced with the hydrogel injection apparatus and the gel was injected over about 10 seconds. The needle was then removed. There was minimal blood loss. The patient tolerated the procedure well. \par \par A few minutes after the procedure, patient stated he felt nauseous then had a drop in BP =91/72 and HR=54. He started to sweat but was conscious and verbally responsive. Bilateral legs were elevated to stabilize the BP. Then juice was given.Patient's BP and HR returned to normal HR=73, KU=892/81. \par \par The patient was transferred to the recovery area on a monitor. Vital signs were stable. He tolerated fluid and snacks by mouth and was made comfortable. He denied pain. Post procedure verbal and written instructions were given and reviewed with patient and his wife (Enma). CT-Sim date and bowel prep reviewed with them.They verbalized understanding of instructions. He voided with no hematuria and had a bowel movement.  He was then discharged home in a stable condition and accompanied by his wife.\par \par \par

## 2023-02-24 ENCOUNTER — NON-APPOINTMENT (OUTPATIENT)
Age: 73
End: 2023-02-24

## 2023-03-27 ENCOUNTER — NON-APPOINTMENT (OUTPATIENT)
Age: 73
End: 2023-03-27

## 2023-03-27 NOTE — REVIEW OF SYSTEMS
[Anal Pain: Grade 0] : Anal Pain: Grade 0 [Constipation: Grade 0] : Constipation: Grade 0 [Diarrhea: Grade 0] : Diarrhea: Grade 0 [Fecal Incontinence: Grade 0] : Fecal Incontinence: Grade 0 [Proctitis: Grade 0] : Proctitis: Grade 0 [Rectal Pain: Grade 0] : Rectal Pain: Grade 0 [Fatigue: Grade 0] : Fatigue: Grade 0 [Hematuria: Grade 0] : Hematuria: Grade 0 [Urinary Incontinence: Grade 0] : Urinary Incontinence: Grade 0  [Urinary Retention: Grade 0] : Urinary Retention: Grade 0 [Urinary Urgency: Grade 1 - Present] : Urinary Urgency: Grade 1 - Present [Urinary Frequency: Grade 0] : Urinary Frequency: Grade 0 [Ejaculation Disorder: Grade 1 - Diminished ejaculation] : Ejaculation Disorder: Grade 1 - Diminished ejaculation  [Erectile Dysfunction: Grade 1 - Decrease in erectile function (frequency or rigidity of erections) but intervention not indicated (e.g., medication or use of mechanical device, penile pump)] : Erectile Dysfunction: Grade 1 - Decrease in erectile function (frequency or rigidity of erections) but intervention not indicated (e.g., medication or use of mechanical device, penile pump) [Alopecia: Grade 0] : Alopecia: Grade 0 [Pruritus: Grade 0] : Pruritus: Grade 0 [Skin Atrophy: Grade 0] : Skin Atrophy: Grade 0 [Skin Hyperpigmentation: Grade 0] : Skin Hyperpigmentation: Grade 0 [Skin Induration: Grade 0] : Skin Induration: Grade 0 [Dermatitis Radiation: Grade 0] : Dermatitis Radiation: Grade 0 [Urinary Tract Pain: Grade 1 - Mild pain] : Urinary Tract Pain: Grade 1 - Mild pain

## 2023-03-30 NOTE — HISTORY OF PRESENT ILLNESS
[FreeTextEntry1] : Mr. 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 Initially seen by  on 12/16/2021 \par \par PMH: CAD s/p PCI 2015 (1 stent), urinary retention and on aiken catheter since 11/10/2021, started on Flomax. Failed TOV on 11/18/2021 and doubled Flomax.Aiken discontinued and passed TOV on 11/24/2021 with .\par \par Daughter: Jenny\par \par (Urologist: , )\par \par 03/27/2023: FIRST OTV -  has completed 5/28 Fx or 1250/7000 cGy to Prostate/SV. patient is accompanied by his daughter Jenny. Today he reports of dysuria. His baseline function is nocturia x1, on Flomax, urinary urgency.Continue RT. \par

## 2023-03-30 NOTE — DISEASE MANAGEMENT
[Clinical] : TNM Stage: c [IIB] : IIB [TTNM] : 1c [NTNM] : 0 [MTNM] : 0 [de-identified] : 3260 [de-identified] : 7000cGy [de-identified] : Prostate/SV

## 2023-04-02 NOTE — REVIEW OF SYSTEMS
[Anal Pain: Grade 0] : Anal Pain: Grade 0 [Constipation: Grade 0] : Constipation: Grade 0 [Diarrhea: Grade 0] : Diarrhea: Grade 0 [Fecal Incontinence: Grade 0] : Fecal Incontinence: Grade 0 [Proctitis: Grade 0] : Proctitis: Grade 0 [Rectal Pain: Grade 0] : Rectal Pain: Grade 0 [Fatigue: Grade 0] : Fatigue: Grade 0 [Hematuria: Grade 0] : Hematuria: Grade 0 [Urinary Incontinence: Grade 0] : Urinary Incontinence: Grade 0  [Urinary Retention: Grade 0] : Urinary Retention: Grade 0 [Urinary Tract Pain: Grade 1 - Mild pain] : Urinary Tract Pain: Grade 1 - Mild pain [Urinary Urgency: Grade 1 - Present] : Urinary Urgency: Grade 1 - Present [Urinary Frequency: Grade 0] : Urinary Frequency: Grade 0 [Ejaculation Disorder: Grade 1 - Diminished ejaculation] : Ejaculation Disorder: Grade 1 - Diminished ejaculation  [Erectile Dysfunction: Grade 1 - Decrease in erectile function (frequency or rigidity of erections) but intervention not indicated (e.g., medication or use of mechanical device, penile pump)] : Erectile Dysfunction: Grade 1 - Decrease in erectile function (frequency or rigidity of erections) but intervention not indicated (e.g., medication or use of mechanical device, penile pump) [Alopecia: Grade 0] : Alopecia: Grade 0 [Pruritus: Grade 0] : Pruritus: Grade 0 [Skin Atrophy: Grade 0] : Skin Atrophy: Grade 0 [Skin Hyperpigmentation: Grade 0] : Skin Hyperpigmentation: Grade 0 [Skin Induration: Grade 0] : Skin Induration: Grade 0 [Dermatitis Radiation: Grade 0] : Dermatitis Radiation: Grade 0

## 2023-04-03 ENCOUNTER — NON-APPOINTMENT (OUTPATIENT)
Age: 73
End: 2023-04-03

## 2023-04-03 RX ORDER — TAMSULOSIN HYDROCHLORIDE 0.4 MG/1
0.4 CAPSULE ORAL
Qty: 60 | Refills: 2 | Status: ACTIVE | COMMUNITY
Start: 2023-04-03 | End: 1900-01-01

## 2023-04-03 NOTE — DISEASE MANAGEMENT
[Clinical] : TNM Stage: c [IIB] : IIB [TTNM] : 1c [NTNM] : 0 [MTNM] : 0 [de-identified] : 8661 [de-identified] : 7000cGy [de-identified] : Prostate/SV

## 2023-04-03 NOTE — HISTORY OF PRESENT ILLNESS
[FreeTextEntry1] : Mr. 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 Initially seen by  on 12/16/2021 \par \par PMH: CAD s/p PCI 2015 (1 stent), urinary retention and on aiken catheter since 11/10/2021, started on Flomax. Failed TOV on 11/18/2021 and doubled Flomax.Aiken discontinued and passed TOV on 11/24/2021 with .\par \par Daughter: Jenny\par \par (Urologist: , )\par \par 04/03/2023: OTV -  has completed 10/28 Fx or 2500/7000 cGy to Prostate/SV. He report nocturia 5-6 times at night, urgency, burning sensation, and daytime frequency. He denies hematuria, weak flow, and bowel issues. He is not taking Flomax..\par \par 03/27/2023: FIRST OTV -  has completed 5/28 Fx or 1250/7000 cGy to Prostate/SV. Patient is accompanied by his daughter Jenny. Today he reports of dysuria. His baseline function is nocturia x1, on Flomax, urinary urgency.Continue RT. \par

## 2023-04-10 ENCOUNTER — NON-APPOINTMENT (OUTPATIENT)
Age: 73
End: 2023-04-10

## 2023-04-10 NOTE — DISEASE MANAGEMENT
[Clinical] : TNM Stage: c [TTNM] : 1c [NTNM] : 0 [MTNM] : 0 [IIB] : IIB [de-identified] : 7734 [de-identified] : 7000cGy [de-identified] : Prostate/SV

## 2023-04-10 NOTE — HISTORY OF PRESENT ILLNESS
[FreeTextEntry1] : Mr. 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 Initially seen by  on 12/16/2021 \par \par PMH: CAD s/p PCI 2015 (1 stent), urinary retention and on aiken catheter since 11/10/2021, started on Flomax. Failed TOV on 11/18/2021 and doubled Flomax.Aiken discontinued and passed TOV on 11/24/2021 with .\par \par Daughter: Jenny\par \par (Urologist: , )\par \par 4/10/23 OTV: Patient presents for his scheduled radiation treatment. Completed 3750/7000 Gy today. He continue to have urinary bother. Taking Flomax 0.4 mg once daily.\par \par 04/03/2023: OTV -  has completed 10/28 Fx or 2500/7000 cGy to Prostate/SV. He report nocturia 5-6 times at night, urgency, burning sensation, and daytime frequency. He denies hematuria, weak flow, and bowel issues. He is not taking Flomax..\par \par 03/27/2023: FIRST OTV -  has completed 5/28 Fx or 1250/7000 cGy to Prostate/SV. Patient is accompanied by his daughter Jenny. Today he reports of dysuria. His baseline function is nocturia x1, on Flomax, urinary urgency.Continue RT. \par

## 2023-04-17 ENCOUNTER — NON-APPOINTMENT (OUTPATIENT)
Age: 73
End: 2023-04-17

## 2023-04-17 NOTE — HISTORY OF PRESENT ILLNESS
[FreeTextEntry1] : Mr. 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 Initially seen by  on 12/16/2021 \par \par PMH: CAD s/p PCI 2015 (1 stent), urinary retention and on aiken catheter since 11/10/2021, started on Flomax. Failed TOV on 11/18/2021 and doubled Flomax.Aiken discontinued and passed TOV on 11/24/2021 with .\par \par Daughter: Kristen\par \par (Urologist: , )\par \par 04/17/2023: OTV -  has completed 19/28 Fx or 4750/7000 cGy to Prostate/SV. he is accompanied by his daughter Kristen. Flomax increased to BID last week. Denies any LUTS and only complaint is nocturia.Continue RT.\par \par 4/10/23 OTV: Patient presents for his scheduled radiation treatment. Completed 3750/7000 Gy today. He continue to have urinary bother. Taking Flomax 0.4 mg once daily.\par \par 04/03/2023: OTV -  has completed 10/28 Fx or 2500/7000 cGy to Prostate/SV. He report nocturia 5-6 times at night, urgency, burning sensation, and daytime frequency. He denies hematuria, weak flow, and bowel issues. He is not taking Flomax..\par \par 03/27/2023: FIRST OTV -  has completed 5/28 Fx or 1250/7000 cGy to Prostate/SV. Patient is accompanied by his daughter Kristen. Today he reports of dysuria. His baseline function is nocturia x1, on Flomax, urinary urgency.Continue RT. \par

## 2023-04-17 NOTE — REVIEW OF SYSTEMS
[Anal Pain: Grade 0] : Anal Pain: Grade 0 [Constipation: Grade 0] : Constipation: Grade 0 [Diarrhea: Grade 0] : Diarrhea: Grade 0 [Fecal Incontinence: Grade 0] : Fecal Incontinence: Grade 0 [Proctitis: Grade 0] : Proctitis: Grade 0 [Rectal Pain: Grade 0] : Rectal Pain: Grade 0 [Fatigue: Grade 0] : Fatigue: Grade 0 [Hematuria: Grade 0] : Hematuria: Grade 0 [Urinary Incontinence: Grade 0] : Urinary Incontinence: Grade 0  [Urinary Retention: Grade 0] : Urinary Retention: Grade 0 [Urinary Tract Pain: Grade 1 - Mild pain] : Urinary Tract Pain: Grade 1 - Mild pain [Urinary Urgency: Grade 1 - Present] : Urinary Urgency: Grade 1 - Present [Ejaculation Disorder: Grade 1 - Diminished ejaculation] : Ejaculation Disorder: Grade 1 - Diminished ejaculation  [Erectile Dysfunction: Grade 1 - Decrease in erectile function (frequency or rigidity of erections) but intervention not indicated (e.g., medication or use of mechanical device, penile pump)] : Erectile Dysfunction: Grade 1 - Decrease in erectile function (frequency or rigidity of erections) but intervention not indicated (e.g., medication or use of mechanical device, penile pump) [Alopecia: Grade 0] : Alopecia: Grade 0 [Pruritus: Grade 0] : Pruritus: Grade 0 [Skin Atrophy: Grade 0] : Skin Atrophy: Grade 0 [Skin Hyperpigmentation: Grade 0] : Skin Hyperpigmentation: Grade 0 [Skin Induration: Grade 0] : Skin Induration: Grade 0 [Dermatitis Radiation: Grade 0] : Dermatitis Radiation: Grade 0 [Urinary Frequency: Grade 0] : Urinary Frequency: Grade 0

## 2023-04-17 NOTE — DISEASE MANAGEMENT
[Clinical] : TNM Stage: c [IIB] : IIB [TTNM] : 1c [NTNM] : 0 [MTNM] : 0 [[place] [id number]] : [id number] [de-identified] : 7000cGy [de-identified] : Prostate/SV

## 2023-04-23 NOTE — REVIEW OF SYSTEMS
[Anal Pain: Grade 0] : Anal Pain: Grade 0 [Constipation: Grade 0] : Constipation: Grade 0 [Diarrhea: Grade 0] : Diarrhea: Grade 0 [Fecal Incontinence: Grade 0] : Fecal Incontinence: Grade 0 [Proctitis: Grade 0] : Proctitis: Grade 0 [Rectal Pain: Grade 0] : Rectal Pain: Grade 0 [Fatigue: Grade 0] : Fatigue: Grade 0 [Hematuria: Grade 0] : Hematuria: Grade 0 [Urinary Incontinence: Grade 0] : Urinary Incontinence: Grade 0  [Urinary Retention: Grade 0] : Urinary Retention: Grade 0 [Urinary Tract Pain: Grade 1 - Mild pain] : Urinary Tract Pain: Grade 1 - Mild pain [Urinary Urgency: Grade 1 - Present] : Urinary Urgency: Grade 1 - Present [Ejaculation Disorder: Grade 1 - Diminished ejaculation] : Ejaculation Disorder: Grade 1 - Diminished ejaculation  [Urinary Frequency: Grade 0] : Urinary Frequency: Grade 0 [Erectile Dysfunction: Grade 1 - Decrease in erectile function (frequency or rigidity of erections) but intervention not indicated (e.g., medication or use of mechanical device, penile pump)] : Erectile Dysfunction: Grade 1 - Decrease in erectile function (frequency or rigidity of erections) but intervention not indicated (e.g., medication or use of mechanical device, penile pump) [Alopecia: Grade 0] : Alopecia: Grade 0 [Pruritus: Grade 0] : Pruritus: Grade 0 [Skin Atrophy: Grade 0] : Skin Atrophy: Grade 0 [Skin Hyperpigmentation: Grade 0] : Skin Hyperpigmentation: Grade 0 [Dermatitis Radiation: Grade 0] : Dermatitis Radiation: Grade 0 [Skin Induration: Grade 0] : Skin Induration: Grade 0

## 2023-04-24 ENCOUNTER — NON-APPOINTMENT (OUTPATIENT)
Age: 73
End: 2023-04-24

## 2023-04-24 NOTE — DISEASE MANAGEMENT
[Clinical] : TNM Stage: c [IIB] : IIB [TTNM] : 1c [NTNM] : 0 [MTNM] : 0 [[place] [id number]] : [id number] [de-identified] : 7000cGy [de-identified] : Prostate/SV intact/finger to nose finger to nose/intact

## 2023-04-24 NOTE — HISTORY OF PRESENT ILLNESS
[FreeTextEntry1] : Mr. 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 Initially seen by  on 12/16/2021 \par \par PMH: CAD s/p PCI 2015 (1 stent), urinary retention and on aiken catheter since 11/10/2021, started on Flomax. Failed TOV on 11/18/2021 and doubled Flomax.Aiken discontinued and passed TOV on 11/24/2021 with .\par \par Daughter: Kristen\par \par (Urologist: , )\par \par 04/24/2023: FINAL OTV -  has completed 25/28 Fx or 6250/7000 cGy to Prostate/SV. He is accompanied by his daughter Kristen.Denies any LUTS and only complaint is nocturia.Continue RT.\par \par 04/17/2023: OTV -  has completed 19/28 Fx or 4750/7000 cGy to Prostate/SV. he is accompanied by his daughter Kristen. Flomax increased to BID last week. Denies any LUTS and only complaint is nocturia.Continue RT.\par \par 4/10/23 OTV: Patient presents for his scheduled radiation treatment. Completed 3750/7000 Gy today. He continue to have urinary bother. Taking Flomax 0.4 mg once daily.\par \par 04/03/2023: OTV -  has completed 10/28 Fx or 2500/7000 cGy to Prostate/SV. He report nocturia 5-6 times at night, urgency, burning sensation, and daytime frequency. He denies hematuria, weak flow, and bowel issues. He is not taking Flomax..\par \par 03/27/2023: FIRST OTV -  has completed 5/28 Fx or 1250/7000 cGy to Prostate/SV. Patient is accompanied by his daughter Kristen. Today he reports of dysuria. His baseline function is nocturia x1, on Flomax, urinary urgency.Continue RT. \par

## 2023-05-21 LAB — PSA SERPL-MCNC: 4.84 NG/ML

## 2023-05-25 ENCOUNTER — APPOINTMENT (OUTPATIENT)
Dept: RADIATION ONCOLOGY | Facility: CLINIC | Age: 73
End: 2023-05-25
Payer: MEDICARE

## 2023-05-25 VITALS
WEIGHT: 180 LBS | TEMPERATURE: 98.3 F | RESPIRATION RATE: 13 BRPM | SYSTOLIC BLOOD PRESSURE: 153 MMHG | DIASTOLIC BLOOD PRESSURE: 87 MMHG | HEART RATE: 73 BPM | OXYGEN SATURATION: 96 % | BODY MASS INDEX: 27.28 KG/M2 | HEIGHT: 68 IN

## 2023-05-25 PROCEDURE — 99024 POSTOP FOLLOW-UP VISIT: CPT

## 2023-05-25 NOTE — DISEASE MANAGEMENT
[1] : T1 [c] : c [0] : M0 [0-10] : 0 -10 ng/mL [Biopsy] : Patient had a biopsy on [7(3+4)] : Template Biopsy B[place]field Score: 7(3+4) [5] : 5 [IIB] : IIB [] : Patient had no Bone Scan performed [BiopsyDate] : 11/09/2021 [MeasuredProstateVolume] : 82 [TotalCores] : 18 [TotalPositiveCores] : 8 [MaxCoreInvolvement] : 100

## 2023-05-25 NOTE — REVIEW OF SYSTEMS
[Nocturia] : nocturia [Urinary Frequency] : urinary frequency [IPSS Score (0-40): ___] : IPSS score: [unfilled] [EPIC-CP Score (0-60): ___] : EPIC-CP score: [unfilled] [Negative] : Neurological [Anal Pain: Grade 0] : Anal Pain: Grade 0 [Constipation: Grade 0] : Constipation: Grade 0 [Diarrhea: Grade 0] : Diarrhea: Grade 0 [Dysphagia: Grade 0] : Dysphagia: Grade 0 [Fecal Incontinence: Grade 0] : Fecal Incontinence: Grade 0 [Rectal Pain: Grade 0] : Rectal Pain: Grade 0 [Hematuria: Grade 0] : Hematuria: Grade 0 [Urinary Incontinence: Grade 0] : Urinary Incontinence: Grade 0  [Urinary Retention: Grade 1 - Urinary, suprapubic or intermittent catheter placement not indicated; able to void with some residual] : Urinary Retention: Grade 1 - Urinary, suprapubic or intermittent catheter placement not indicated; able to void with some residual [Urinary Tract Pain: Grade 0] : Urinary Tract Pain: Grade 0 [Urinary Urgency: Grade 1 - Present] : Urinary Urgency: Grade 1 - Present [Urinary Frequency: Grade 1 - Present] : Urinary Frequency: Grade 1 - Present [Ejaculation Disorder: Grade 1 - Diminished ejaculation] : Ejaculation Disorder: Grade 1 - Diminished ejaculation  [Erectile Dysfunction: Grade 1 - Decrease in erectile function (frequency or rigidity of erections) but intervention not indicated (e.g., medication or use of mechanical device, penile pump)] : Erectile Dysfunction: Grade 1 - Decrease in erectile function (frequency or rigidity of erections) but intervention not indicated (e.g., medication or use of mechanical device, penile pump) [Urinary Ostomy] : no ~T urinary ostomy present

## 2023-05-25 NOTE — HISTORY OF PRESENT ILLNESS
[FreeTextEntry1] : Mr. 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 Initially seen by  on 12/16/2021 and then treated by  with IMRT 28 Fx or 7000 cGy to Prostate from 03/21/2023 - 04/27/2023. He tolerated RT well without developing any Grade 3 or higher acute toxicity as a result of his treatment and the KPS remained stable during the course of radiation treatment.\par \par 11/222/2021: Decipher Score: 0.59 (Intermediate Risk), 5 year risk of metastasis is 2.7%, 10 year risk of metastasis is 6.9%, 15 year risk of prostate cancer is 9.5%\par \par PMH: CAD s/p PCI 2015 (1 stent), urinary retention and on aiken catheter since 11/10/2021, started on Flomax. Failed TOV on 11/18/2021 and doubled Flomax.Aiken discontinued and passed TOV on 11/24/2021 with .\par \par (Urologist: , )\par \par Daughter: Jenny\par \par PSA Trend:\par 03/23/2021:   8.5 ng/mL\par 04/02/2021:   9.15 ng/mL\par 12/14/2021: 10.40 ng/mL\par 12/09/2022:   9.79 ng/mL\par 05/20/2023:   4.84 ng/mL\par \par Pt is here for post treatment evaluation. Pt reports mild dribbling, mild dysuria, and some urinary frequency. Pt also reports nocturia x 3. Pt also reports occasional urinary retention. Pt denies bowel symptoms. Pt is not sexually active. Pt is not on flomax.

## 2023-11-30 ENCOUNTER — APPOINTMENT (OUTPATIENT)
Dept: RADIATION ONCOLOGY | Facility: CLINIC | Age: 73
End: 2023-11-30
Payer: MEDICARE

## 2023-11-30 ENCOUNTER — NON-APPOINTMENT (OUTPATIENT)
Age: 73
End: 2023-11-30

## 2023-11-30 VITALS
TEMPERATURE: 98.4 F | RESPIRATION RATE: 14 BRPM | HEIGHT: 68 IN | DIASTOLIC BLOOD PRESSURE: 96 MMHG | HEART RATE: 7 BPM | OXYGEN SATURATION: 98 % | BODY MASS INDEX: 26.22 KG/M2 | WEIGHT: 173 LBS | SYSTOLIC BLOOD PRESSURE: 162 MMHG

## 2023-11-30 DIAGNOSIS — C61 MALIGNANT NEOPLASM OF PROSTATE: ICD-10-CM

## 2023-11-30 LAB
PSA FREE FLD-MCNC: 18 %
PSA FREE SERPL-MCNC: 0.35 NG/ML
PSA SERPL-MCNC: 1.9 NG/ML

## 2023-11-30 PROCEDURE — 99213 OFFICE O/P EST LOW 20 MIN: CPT

## 2024-06-19 LAB — PSA SERPL-MCNC: 0.97 NG/ML

## 2024-06-20 ENCOUNTER — NON-APPOINTMENT (OUTPATIENT)
Age: 74
End: 2024-06-20

## 2024-06-20 ENCOUNTER — APPOINTMENT (OUTPATIENT)
Dept: RADIATION ONCOLOGY | Facility: CLINIC | Age: 74
End: 2024-06-20
Payer: MEDICARE

## 2024-06-20 VITALS
OXYGEN SATURATION: 98 % | HEIGHT: 68 IN | BODY MASS INDEX: 26.22 KG/M2 | WEIGHT: 173 LBS | SYSTOLIC BLOOD PRESSURE: 174 MMHG | TEMPERATURE: 97.8 F | DIASTOLIC BLOOD PRESSURE: 105 MMHG | HEART RATE: 71 BPM | RESPIRATION RATE: 14 BRPM

## 2024-06-20 DIAGNOSIS — I10 ESSENTIAL (PRIMARY) HYPERTENSION: ICD-10-CM

## 2024-06-20 PROCEDURE — G2211 COMPLEX E/M VISIT ADD ON: CPT

## 2024-06-20 PROCEDURE — 99213 OFFICE O/P EST LOW 20 MIN: CPT

## 2024-06-20 RX ORDER — AMOXICILLIN AND CLAVULANATE POTASSIUM 875; 125 MG/1; MG/1
875-125 TABLET, COATED ORAL
Qty: 6 | Refills: 0 | Status: COMPLETED | COMMUNITY
Start: 2023-02-22 | End: 2024-06-20

## 2024-06-20 RX ORDER — TAMSULOSIN HYDROCHLORIDE 0.4 MG/1
0.4 CAPSULE ORAL
Qty: 180 | Refills: 3 | Status: COMPLETED | COMMUNITY
Start: 2021-12-02 | End: 2024-06-20

## 2024-06-20 RX ADMIN — ATORVASTATIN CALCIUM 1 MG: 80 TABLET, FILM COATED ORAL at 00:00

## 2024-06-20 RX ADMIN — VALSARTAN 0 MG: 160 TABLET, COATED ORAL at 00:00

## 2024-06-20 RX ADMIN — Medication 0 MG: at 00:00

## 2024-06-20 RX ADMIN — METOPROLOL SUCCINATE 1 MG: 50 TABLET, EXTENDED RELEASE ORAL at 00:00

## 2024-06-20 RX ADMIN — METFORMIN HYDROCHLORIDE 0 MG: 500 TABLET, COATED ORAL at 00:00

## 2024-06-20 NOTE — HISTORY OF PRESENT ILLNESS
[FreeTextEntry1] : Mr. 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 Initially seen by  on 12/16/2021 and then treated by  with IMRT 28 Fx or 7000 cGy to Prostate from 03/21/2023 - 04/27/2023. He tolerated RT well without developing any Grade 3 or higher acute toxicity as a result of his treatment and the KPS remained stable during the course of radiation treatment.  11/222/2021: Decipher Score: 0.59 (Intermediate Risk), 5 year risk of metastasis is 2.7%, 10 year risk of metastasis is 6.9%, 15 year risk of prostate cancer is 9.5%  PMH: CAD s/p PCI 2015 (1 stent), urinary retention and on aiken catheter since 11/10/2021, started on Flomax. Failed TOV on 11/18/2021 and doubled Flomax.Aiken discontinued and passed TOV on 11/24/2021 with .  (Urologist: , )  Daughter: Jenny  PSA Trend: 03/23/2021:   8.5 ng/mL 04/02/2021:   9.15 ng/mL 12/14/2021: 10.40 ng/mL 12/09/2022:   9.79 ng/mL 05/20/2023:   4.84 ng/mL 11/28/2023:   1.90ng/mL  Mr. Vidal is here for a follow up appt.

## 2024-06-20 NOTE — DISEASE MANAGEMENT
[] : Patient had no Bone Scan performed [BiopsyDate] : 11/09/2021 [MeasuredProstateVolume] : 82 [TotalPositiveCores] : 8 [TotalCores] : 18 [MaxCoreInvolvement] : 100

## 2025-01-16 ENCOUNTER — APPOINTMENT (OUTPATIENT)
Dept: RADIATION ONCOLOGY | Facility: CLINIC | Age: 75
End: 2025-01-16

## 2025-01-16 ENCOUNTER — NON-APPOINTMENT (OUTPATIENT)
Age: 75
End: 2025-01-16

## 2025-01-21 ENCOUNTER — APPOINTMENT (OUTPATIENT)
Dept: RADIATION ONCOLOGY | Facility: CLINIC | Age: 75
End: 2025-01-21
Payer: MEDICARE

## 2025-01-21 VITALS
DIASTOLIC BLOOD PRESSURE: 82 MMHG | HEART RATE: 77 BPM | BODY MASS INDEX: 26.52 KG/M2 | OXYGEN SATURATION: 98 % | SYSTOLIC BLOOD PRESSURE: 144 MMHG | RESPIRATION RATE: 16 BRPM | HEIGHT: 68 IN | TEMPERATURE: 98.4 F | WEIGHT: 175 LBS

## 2025-01-21 DIAGNOSIS — C61 MALIGNANT NEOPLASM OF PROSTATE: ICD-10-CM

## 2025-01-21 PROCEDURE — 99213 OFFICE O/P EST LOW 20 MIN: CPT

## 2025-01-21 PROCEDURE — G2211 COMPLEX E/M VISIT ADD ON: CPT

## 2025-01-21 RX ORDER — EZETIMIBE 10 MG/1
10 TABLET ORAL
Refills: 0 | Status: ACTIVE | COMMUNITY

## 2025-07-24 ENCOUNTER — NON-APPOINTMENT (OUTPATIENT)
Age: 75
End: 2025-07-24

## 2025-07-24 ENCOUNTER — APPOINTMENT (OUTPATIENT)
Dept: RADIATION ONCOLOGY | Facility: CLINIC | Age: 75
End: 2025-07-24
Payer: MEDICARE

## 2025-07-24 DIAGNOSIS — C61 MALIGNANT NEOPLASM OF PROSTATE: ICD-10-CM

## 2025-07-24 PROCEDURE — 99213 OFFICE O/P EST LOW 20 MIN: CPT

## 2025-07-24 PROCEDURE — G2211 COMPLEX E/M VISIT ADD ON: CPT

## 2025-07-24 RX ADMIN — EZETIMIBE 1 MG: 10 TABLET ORAL at 00:00

## 2025-07-24 RX ADMIN — DAPAGLIFLOZIN 1 MG: 10 TABLET, FILM COATED ORAL at 00:00
